# Patient Record
(demographics unavailable — no encounter records)

---

## 2024-10-23 NOTE — CONSULT LETTER
[Dear  ___] : Dear  [unfilled], [Consult Letter:] : I had the pleasure of evaluating your patient, [unfilled]. [Please see my note below.] : Please see my note below. [Consult Closing:] : Thank you very much for allowing me to participate in the care of this patient.  If you have any questions, please do not hesitate to contact me. [Sincerely,] : Sincerely, [DrWilver  ___] : Dr. MARY

## 2024-10-23 NOTE — HISTORY OF PRESENT ILLNESS
[TextBox_4] : 10/23/24  59F Obese Snoring and excessive daytime sleepiness HTN  [facility] [employer]  on CPAP for TI[employer]  Here to review sleep study ordered by Dr Mosley

## 2025-01-23 NOTE — HISTORY OF PRESENT ILLNESS
[TextBox_4] : 10/23/24  59F Obese Snoring and excessive daytime sleepiness HTN  Works at [employer]  on CPAP for TI[employer]  Here to review sleep study ordered by Dr Mosley  1/23/25 Compliant with and benefiting from nocturnal CPAP Sleeping and feeling much better.

## 2025-01-23 NOTE — DISCUSSION/SUMMARY
[FreeTextEntry1] : IMP  Obesity Severe TIFF Compliant with and benefiting from nocturnal CPAP  Plan  Weight loss Nasal APAP 12-month FU